# Patient Record
Sex: MALE | Race: WHITE | NOT HISPANIC OR LATINO | URBAN - METROPOLITAN AREA
[De-identification: names, ages, dates, MRNs, and addresses within clinical notes are randomized per-mention and may not be internally consistent; named-entity substitution may affect disease eponyms.]

---

## 2021-05-22 ENCOUNTER — NURSE TRIAGE (OUTPATIENT)
Dept: NURSING | Facility: CLINIC | Age: 41
End: 2021-05-22

## 2021-05-22 NOTE — TELEPHONE ENCOUNTER
Triage Call:    -Girlfriend of patient Julia is calling. No CTC on file. Julia is with patient who is currently sleeping but has a strong history of alcohol intoxication per Julia.   -RN only gathered information and gave advise.   -Julia wants admit patient for alcohol detox.   -Patient is currently sleeping due to alcohol intoxication and partner states patient is breathing currently and denies any slow/shallow/weak breathing along with slurred speech or disorientation.   -Julia denies any violent behavior or thoughts of self harm or seizures.   -Julia states patient has probably had in the last three days three 750 ml of whiskey and 2 tall cans of a high alcohol content beer.   -Per protocol, recommendations are for patient or Julia to call 911. Julia would prefer taking patient into the ER. RN advised 911, Julia declined still. Julia will have her and patients brother take patient to ER now. RN advised once patient is seen to call back if patient develops any new or worsening sx. Julia verbalized understanding and agrees with plan.     Rae Templeton RN, BSN Nurse Triage Advisor 3:33 PM 5/22/2021     Reason for Disposition    Sounds like a life-threatening emergency to the triager    Additional Information    Negative: Coma (e.g., not moving, not talking, not responding to stimuli)    Negative: Difficult to awaken or acting confused (e.g., disoriented, slurred speech)    Negative: Slow, shallow and weak breathing    Negative: Seeing, hearing, or feeling things that are not there (i.e., visual, auditory, or tactile hallucinations)    Negative: Seizure    Negative: Threatening suicide    Negative: Patient attempted suicide    Negative: Violent behavior, or threatening to physically hurt or kill someone    Protocols used: ALCOHOL ABUSE AND [id number]    COVID 19 Nurse Triage Plan/Patient Instructions    Please be aware that novel coronavirus (COVID-19) may be circulating in the community. If you develop  symptoms such as fever, cough, or SOB or if you have concerns about the presence of another infection including coronavirus (COVID-19), please contact your health care provider or visit [email or web address].     Disposition/Instructions    Call to EMS/911 recommended. Follow protocol based instructions.     Bring Your Own Device:  Please also bring your smart device(s) (smart phones, tablets, laptops) and their charging cables for your personal use and to communicate with your care team during your visit.    Thank you for taking steps to prevent the spread of this virus.  o Limit your contact with others.  o Wear a simple mask to cover your cough.  o Wash your hands well and often.    Resources    [facility]: About COVID-19: [email or web address]    CDC: What to Do If You're Sick: www.cdc.gov/coronavirus/2019-ncov/about/steps-when-sick.html    CDC: Ending Home Isolation: www.cdc.gov/coronavirus/2019-ncov/hcp/disposition-in-home-patients.html     CDC: Caring for Someone: www.cdc.gov/coronavirus/2019-ncov/if-you-are-sick/care-for-someone.html     [facility]: Interim Guidance for Hospital Discharge to Home: www.[email or web address].mn.us/diseases/coronavirus/hcp/hospdischarge.pdf    [facility] clinical trials (COVID-19 research studies): [email or web address]-clinical-trials     Below are the COVID-19 hotlines at the [facility] ([facility]). Interpreters are available.   o For health questions: Call [phone] or 1-111.268.8093 (7 a.m. to 7 p.m.)  o For questions about schools and childcare: Call [phone] or [phone] (7 a.m. to 7 p.m.)

## 2021-06-16 PROBLEM — M71.10 SEPTIC BURSITIS: Status: ACTIVE | Noted: 2019-03-08

## 2021-07-29 ENCOUNTER — NURSE TRIAGE (OUTPATIENT)
Dept: NURSING | Facility: CLINIC | Age: 41
End: 2021-07-29

## 2021-07-29 NOTE — TELEPHONE ENCOUNTER
"Pt is calling in about alcohol abuse, and dependence. Pt reports that he has not eaten in 5 days. Pt reports every time he eats he vomits it up. Pt reports dark stools, but unsure if it is blood, and also dark colored vomit. Pt reports he just had a small glass of wine, and every time he drinks his stomach hurts. Pt reports his pain level in his abdomen is about \"7-8\", and it has been going on for about 3 weeks on and off. Pt last had a drink about an hour ago, but also had one this morning. Pt reports he was drinking because he is depressed. Pt denies any thoughts of harming himself or others, and no suicidal ideation. Pt denies any seizures but he did faint a few days ago. Pt states, \"I just want to feel better\".   Pt was advised per protocol to have someone drive him to the ER now. Pt reports his girlfriend is at work, and he is unable to get out of bed. Pt was advised to call 911, but he says he cannot afford that. Pt agrees to get to the ER as soon as his girlfriend gets home. Pt was advised to call back if symptoms worsen.     Benny Rascon RN on 7/29/2021 at 1:59 PM       Reason for Disposition    Multiple episodes of vomiting and lasting more than 2 hours    SEVERE abdominal pain (e.g., excruciating)    Constant abdominal pain lasting > 2 hours    Bloody, black, or tarry bowel movements (Exception: chronic-unchanged black-grey bowel movements and is taking iron pills or Pepto-bismol)    Additional Information    Negative: Coma (e.g., not moving, not talking, not responding to stimuli)    Negative: Difficult to awaken or acting confused (e.g., disoriented, slurred speech)    Negative: Seeing, hearing, or feeling things that are not there (i.e., visual, auditory, or tactile hallucinations)    Negative: Slow, shallow and weak breathing    Negative: Seizure    Negative: Violent behavior, or threatening to physically hurt or kill someone    Negative: Patient attempted suicide    Negative: Threatening " suicide    Negative: Sounds like a life-threatening emergency to the triager    Negative: Substance abuse or dependence: question or problem related to    Negative: Depression is main problem or symptom (e.g., feelings of sadness or hopelessness)    Negative: Vomiting red blood or black (coffee ground) material (Exception: few red streaks in vomit that only happened once)    Protocols used: ALCOHOL ABUSE AND [id number]